# Patient Record
Sex: FEMALE
[De-identification: names, ages, dates, MRNs, and addresses within clinical notes are randomized per-mention and may not be internally consistent; named-entity substitution may affect disease eponyms.]

---

## 2019-12-23 PROBLEM — Z00.00 ENCOUNTER FOR PREVENTIVE HEALTH EXAMINATION: Status: ACTIVE | Noted: 2019-12-23

## 2020-01-02 ENCOUNTER — MESSAGE (OUTPATIENT)
Age: 45
End: 2020-01-02

## 2020-01-03 ENCOUNTER — APPOINTMENT (OUTPATIENT)
Dept: UROLOGY | Facility: CLINIC | Age: 45
End: 2020-01-03
Payer: MEDICAID

## 2020-01-03 VITALS
SYSTOLIC BLOOD PRESSURE: 139 MMHG | BODY MASS INDEX: 29.23 KG/M2 | HEART RATE: 77 BPM | OXYGEN SATURATION: 97 % | HEIGHT: 63 IN | WEIGHT: 165 LBS | DIASTOLIC BLOOD PRESSURE: 83 MMHG

## 2020-01-03 DIAGNOSIS — Z87.2 PERSONAL HISTORY OF DISEASES OF THE SKIN AND SUBCUTANEOUS TISSUE: ICD-10-CM

## 2020-01-03 DIAGNOSIS — R39.15 URGENCY OF URINATION: ICD-10-CM

## 2020-01-03 DIAGNOSIS — N39.0 URINARY TRACT INFECTION, SITE NOT SPECIFIED: ICD-10-CM

## 2020-01-03 LAB
BILIRUB UR QL STRIP: NORMAL
CLARITY UR: CLEAR
COLLECTION METHOD: NORMAL
GLUCOSE UR-MCNC: NORMAL
HCG UR QL: 0.2 EU/DL
HGB UR QL STRIP.AUTO: NORMAL
KETONES UR-MCNC: NORMAL
LEUKOCYTE ESTERASE UR QL STRIP: NORMAL
NITRITE UR QL STRIP: NORMAL
PH UR STRIP: 6
PROT UR STRIP-MCNC: NORMAL
SP GR UR STRIP: 1.01

## 2020-01-03 PROCEDURE — 81003 URINALYSIS AUTO W/O SCOPE: CPT | Mod: QW

## 2020-01-03 PROCEDURE — 99204 OFFICE O/P NEW MOD 45 MIN: CPT | Mod: 25

## 2020-01-03 NOTE — REVIEW OF SYSTEMS
[Negative] : Heme/Lymph [Genital yeast infection] : genital yeast infection [Wake up at night to urinate  How many times?  ___] : wakes up to urinate [unfilled] times during the night [Genital bacterial infection] : genital bacterial infection

## 2020-01-06 LAB
APPEARANCE: CLEAR
BACTERIA UR CULT: NORMAL
BACTERIA: NEGATIVE
BILIRUBIN URINE: NEGATIVE
BLOOD URINE: NEGATIVE
COLOR: COLORLESS
GLUCOSE QUALITATIVE U: NEGATIVE
HYALINE CASTS: 0 /LPF
KETONES URINE: NEGATIVE
LEUKOCYTE ESTERASE URINE: NEGATIVE
MICROSCOPIC-UA: NORMAL
NITRITE URINE: NEGATIVE
PH URINE: 5.5
PROTEIN URINE: NEGATIVE
RED BLOOD CELLS URINE: 0 /HPF
SPECIFIC GRAVITY URINE: 1.01
SQUAMOUS EPITHELIAL CELLS: 0 /HPF
UROBILINOGEN URINE: NORMAL
WHITE BLOOD CELLS URINE: 0 /HPF

## 2020-01-07 ENCOUNTER — APPOINTMENT (OUTPATIENT)
Dept: UROLOGY | Facility: CLINIC | Age: 45
End: 2020-01-07

## 2020-01-17 ENCOUNTER — TRANSCRIPTION ENCOUNTER (OUTPATIENT)
Age: 45
End: 2020-01-17

## 2020-01-20 PROBLEM — Z87.2 HISTORY OF PSORIASIS: Status: RESOLVED | Noted: 2020-01-20 | Resolved: 2020-01-20

## 2020-01-20 RX ORDER — GUSELKUMAB 100 MG/ML
INJECTION SUBCUTANEOUS
Refills: 0 | Status: ACTIVE | COMMUNITY

## 2020-01-24 NOTE — ASSESSMENT
[FreeTextEntry1] : 45 yo M with urinary urgency and frequent UTIs in setting of treatment of monoclonal antibody against interleukin. This therapy increases her risk of all infections including UTIs. Patient was counselled on behavioral modifications to reduce her risk of urinary tract infections including wiping front to back, good general hygene, voiding following sex, avoiding douching and spermacides. We discussed cranberry supplements which may reduce UTI, though the data is equivocal. We also discussed different antibiotic therapy including low dose antibiotic suppression, post coital prophylaxis, and self start therapy. Recommended Renal US to evaluate upper tracts. Patient is about to leave country, but states she will pursue RBUS in [place]. Recommend mirabegron for urgency, which is likely due to frequent UTIs. Will Rx for macrobid 100 mg daily for suppressive ppx. She will RTO when she returns from international travel.

## 2020-01-24 NOTE — PHYSICAL EXAM
[General Appearance - Well Developed] : well developed [General Appearance - Well Nourished] : well nourished [Normal Appearance] : normal appearance [Well Groomed] : well groomed [General Appearance - In No Acute Distress] : no acute distress [Edema] : no peripheral edema [] : no respiratory distress [Respiration, Rhythm And Depth] : normal respiratory rhythm and effort [Exaggerated Use Of Accessory Muscles For Inspiration] : no accessory muscle use [Abdomen Soft] : soft [Abdomen Tenderness] : non-tender [Costovertebral Angle Tenderness] : no ~M costovertebral angle tenderness [Urinary Bladder Findings] : the bladder was normal on palpation [Normal Station and Gait] : the gait and station were normal for the patient's age [FreeTextEntry1] : psoriatic plaques on lower extremities [No Focal Deficits] : no focal deficits [Oriented To Time, Place, And Person] : oriented to person, place, and time [Affect] : the affect was normal [Mood] : the mood was normal [Not Anxious] : not anxious [No Palpable Adenopathy] : no palpable adenopathy

## 2020-01-24 NOTE — HISTORY OF PRESENT ILLNESS
[FreeTextEntry1] : 44 year old woman  seen 01/03/2020 with complaint of frequent UTI. This began months ago after pt was started on biologic for her psoriasis. She gets UTIs about every few weeks. Her usual UTI symptoms are dysuria, urgency, frequency, and bladder pain. She also feels incomplete bladder emptying. Nocturia 6-7x/night. She had cystoscopy by urologist in [facility] [place] which was unremarkable. \par It is severe in severity. Nothing makes the symptoms better, nothing makes sx worse. \par UTIs are not associated with sexual activity. \par No hematuria. No incontinence. No fevers, no chills, no nausea, no vomiting, no flank pain. No family history contributory to Frequent UTIs.

## 2020-06-15 ENCOUNTER — APPOINTMENT (OUTPATIENT)
Dept: RHEUMATOLOGY | Facility: CLINIC | Age: 45
End: 2020-06-15

## 2020-06-22 ENCOUNTER — APPOINTMENT (OUTPATIENT)
Dept: UROLOGY | Facility: CLINIC | Age: 45
End: 2020-06-22

## 2021-06-18 ENCOUNTER — TRANSCRIPTION ENCOUNTER (OUTPATIENT)
Age: 46
End: 2021-06-18

## 2021-07-14 RX ORDER — NITROFURANTOIN (MONOHYDRATE/MACROCRYSTALS) 25; 75 MG/1; MG/1
100 CAPSULE ORAL
Qty: 30 | Refills: 5 | Status: COMPLETED | COMMUNITY
Start: 2020-01-03 | End: 2021-07-14

## 2021-07-15 ENCOUNTER — NON-APPOINTMENT (OUTPATIENT)
Age: 46
End: 2021-07-15

## 2021-07-15 ENCOUNTER — APPOINTMENT (OUTPATIENT)
Dept: ENDOCRINOLOGY | Facility: CLINIC | Age: 46
End: 2021-07-15
Payer: MEDICAID

## 2021-07-15 DIAGNOSIS — Z78.9 OTHER SPECIFIED HEALTH STATUS: ICD-10-CM

## 2021-07-15 DIAGNOSIS — L40.9 PSORIASIS, UNSPECIFIED: ICD-10-CM

## 2021-07-15 DIAGNOSIS — I67.83 POSTERIOR REVERSIBLE ENCEPHALOPATHY SYNDROME: ICD-10-CM

## 2021-07-15 PROCEDURE — 99204 OFFICE O/P NEW MOD 45 MIN: CPT

## 2021-07-15 PROCEDURE — 99072 ADDL SUPL MATRL&STAF TM PHE: CPT

## 2021-07-15 RX ORDER — OXYBUTYNIN CHLORIDE 5 MG/1
5 TABLET, EXTENDED RELEASE ORAL
Qty: 90 | Refills: 2 | Status: COMPLETED | COMMUNITY
Start: 2020-01-03 | End: 2021-07-15

## 2021-07-15 RX ORDER — MIRABEGRON 50 MG/1
50 TABLET, FILM COATED, EXTENDED RELEASE ORAL
Refills: 0 | Status: COMPLETED | COMMUNITY
End: 2021-07-15

## 2021-07-15 RX ORDER — FLUCONAZOLE 100 MG/1
100 TABLET ORAL DAILY
Qty: 4 | Refills: 0 | Status: COMPLETED | COMMUNITY
Start: 2020-01-03 | End: 2021-07-15

## 2021-12-20 ENCOUNTER — APPOINTMENT (OUTPATIENT)
Dept: ENDOCRINOLOGY | Facility: CLINIC | Age: 46
End: 2021-12-20

## 2022-06-30 ENCOUNTER — APPOINTMENT (OUTPATIENT)
Dept: ENDOCRINOLOGY | Facility: CLINIC | Age: 47
End: 2022-06-30

## 2022-07-25 LAB — TSH SERPL-ACNC: 1.17

## 2022-07-26 ENCOUNTER — APPOINTMENT (OUTPATIENT)
Dept: ENDOCRINOLOGY | Facility: CLINIC | Age: 47
End: 2022-07-26

## 2022-07-26 VITALS
HEIGHT: 63 IN | DIASTOLIC BLOOD PRESSURE: 84 MMHG | OXYGEN SATURATION: 99 % | SYSTOLIC BLOOD PRESSURE: 122 MMHG | BODY MASS INDEX: 26.58 KG/M2 | HEART RATE: 71 BPM | WEIGHT: 150 LBS

## 2022-07-26 PROCEDURE — 99213 OFFICE O/P EST LOW 20 MIN: CPT

## 2022-07-26 NOTE — ASSESSMENT
[FreeTextEntry1] : Dinora is a 46 yr old female, who was seen for follow up of thyroid nodules.\par Previous sonogram revealed 3 nodules  in left lobe 0.34 x 0.157 x 0.239 cm, second one 0.531 x 0.317 x 0.454 and 3rd one 0.232 x 0.172 x 0.27 cm\par Sonogram from 7/19/22 revealed one left lobe cyst 0.7x0.7x0.2cm. \par \par Patient is worried that the thyroid cyst could develop into cancer as her mother was diagnosis with liver cancer and passed away recently. \par \par I have discussed with the patient the incidence of thyroid nodules (fairly high) and the incidence of thyroid cancer (fairly low). We also discussed what can be the worrisome features of malignant nodule and increased incidence of thyroid cancer in patients >60 years old.\par \par We discussed with patient that per EMIGDIO  guidelines  FNA is not recommended in sub centimeter nodules in low risk patient like herself risk of malignancy is very low.\par \par Vitamin D Deficiency - PMD treating with vitamin D 50,000 units weekly\par \par RTO in 6 months for follow up US and labs with Dr. Rivera \par \par \par

## 2022-07-26 NOTE — PHYSICAL EXAM
[Alert] : alert [Well Nourished] : well nourished [No Acute Distress] : no acute distress [Well Developed] : well developed [Normal Sclera/Conjunctiva] : normal sclera/conjunctiva [No Proptosis] : no proptosis [No LAD] : no lymphadenopathy [Thyroid Not Enlarged] : the thyroid was not enlarged [No Thyroid Nodules] : no palpable thyroid nodules [No Respiratory Distress] : no respiratory distress [No Accessory Muscle Use] : no accessory muscle use [Normal Rate and Effort] : normal respiratory rate and effort [Clear to Auscultation] : lungs were clear to auscultation bilaterally [Normal S1, S2] : normal S1 and S2 [Normal Rate] : heart rate was normal [Regular Rhythm] : with a regular rhythm [No Edema] : no peripheral edema [No Stigmata of Cushings Syndrome] : no stigmata of Cushings Syndrome [Normal Gait] : normal gait [No Motor Deficits] : the motor exam was normal [No Tremors] : no tremors [Oriented x3] : oriented to person, place, and time [Normal Affect] : the affect was normal [Normal Insight/Judgement] : insight and judgment were intact [Normal Mood] : the mood was normal [Acanthosis Nigricans] : no acanthosis nigricans

## 2022-07-26 NOTE — REVIEW OF SYSTEMS
[Recent Weight Loss (___ Lbs)] : recent weight loss: [unfilled] lbs [Depression] : depression [Fatigue] : no fatigue [Decreased Appetite] : appetite not decreased [Visual Field Defect] : no visual field defect [Blurred Vision] : no blurred vision [Dysphagia] : no dysphagia [Neck Pain] : no neck pain [Dysphonia] : no dysphonia [Chest Pain] : no chest pain [Palpitations] : no palpitations [Dry Skin] : no dry skin [Hair Loss] : no hair loss [Headaches] : no headaches [Tremors] : no tremors [Anxiety] : no anxiety [Cold Intolerance] : no cold intolerance [Heat Intolerance] : no heat intolerance [de-identified] : mother recently passed

## 2022-07-26 NOTE — HISTORY OF PRESENT ILLNESS
[FreeTextEntry1] : Dinora is a 46 yr old female, here for follow up of thyroid nodule.\par She has Psoriasis, was on steroids, gained 30 pounds in 3 months in 2019.Since then gaining and loosing some.\par During a routine visit, she had thyroid ultrasound  in 7/21 showing some nodules.\par \par No family h/o thyroid disorder or cancer.no h/o neck radiation. No dysphagia, no SOB.Denies heat or cold intolerance, no constipation or diarrhea, no palpitations, energy level fair, no skin or hair changes.\par 2 kids, still has her menstrual cycles, regular.\par Has overactive bladder.Seeing urologist.\par \par Patient reports her mother recently passed due to stage 4 liver cancer \par \par Labs: \par 7/19/22\par TSH 1.1\par Free T4 1.0\par Total T3 89\par \par Patient follow up with gastroenterology due to reactive Hep A on labs

## 2023-07-20 ENCOUNTER — APPOINTMENT (OUTPATIENT)
Dept: ENDOCRINOLOGY | Facility: CLINIC | Age: 48
End: 2023-07-20
Payer: MEDICAID

## 2023-07-20 VITALS
DIASTOLIC BLOOD PRESSURE: 60 MMHG | BODY MASS INDEX: 25.69 KG/M2 | WEIGHT: 145 LBS | HEART RATE: 98 BPM | OXYGEN SATURATION: 99 % | HEIGHT: 63 IN | SYSTOLIC BLOOD PRESSURE: 120 MMHG

## 2023-07-20 DIAGNOSIS — E55.9 VITAMIN D DEFICIENCY, UNSPECIFIED: ICD-10-CM

## 2023-07-20 DIAGNOSIS — E04.2 NONTOXIC MULTINODULAR GOITER: ICD-10-CM

## 2023-07-20 PROCEDURE — 99214 OFFICE O/P EST MOD 30 MIN: CPT

## 2023-12-20 ENCOUNTER — OFFICE (OUTPATIENT)
Dept: URBAN - METROPOLITAN AREA CLINIC 12 | Facility: CLINIC | Age: 48
Setting detail: OPHTHALMOLOGY
End: 2023-12-20
Payer: COMMERCIAL

## 2023-12-20 DIAGNOSIS — H16.103: ICD-10-CM

## 2023-12-20 DIAGNOSIS — H16.223: ICD-10-CM

## 2023-12-20 PROCEDURE — 92012 INTRM OPH EXAM EST PATIENT: CPT | Performed by: OPTOMETRIST

## 2023-12-20 ASSESSMENT — SPHEQUIV_DERIVED
OS_SPHEQUIV: -1
OD_SPHEQUIV: -1.125
OS_SPHEQUIV: -1.375
OD_SPHEQUIV: -0.625
OS_SPHEQUIV: -1.125
OD_SPHEQUIV: -0.875

## 2023-12-20 ASSESSMENT — REFRACTION_MANIFEST
OD_AXIS: 180
OS_VA1: 20/25-2
OS_SPHERE: -1.00
OD_CYLINDER: -0.25
OD_VA1: 20/20
OD_CYLINDER: -0.75
OS_VA1: 20/30+
OD_SPHERE: -0.50
OD_VA1: 20/20-2
OS_AXIS: 180
OS_SPHERE: -0.75
OS_ADD: +1.50
OS_CYLINDER: -0.75
OD_AXIS: 180
OD_SPHERE: -0.50
OS_ADD: +1.50
OD_ADD: +1.50
OS_AXIS: 180
OD_ADD: +1.50
OS_CYLINDER: -0.50

## 2023-12-20 ASSESSMENT — REFRACTION_CURRENTRX
OD_VPRISM_DIRECTION: SV
OD_OVR_VA: 20/
OD_VPRISM_DIRECTION: SV
OD_AXIS: 180
OD_AXIS: 014
OS_SPHERE: +0.50
OS_VPRISM_DIRECTION: SV
OS_CYLINDER: -0.75
OD_SPHERE: +1.00
OS_SPHERE: -1.00
OS_OVR_VA: 20/
OS_AXIS: 175
OD_OVR_VA: 20/
OD_CYLINDER: -0.25
OS_AXIS: 179
OS_OVR_VA: 20/
OS_VPRISM_DIRECTION: SV
OD_SPHERE: -0.50
OS_CYLINDER: -0.75
OD_CYLINDER: -0.25

## 2023-12-20 ASSESSMENT — SUPERFICIAL PUNCTATE KERATITIS (SPK)
OD_SPK: 2+
OS_SPK: 2+

## 2023-12-20 ASSESSMENT — REFRACTION_AUTOREFRACTION
OS_SPHERE: -1.00
OS_CYLINDER: -0.25
OD_AXIS: 016
OD_SPHERE: -0.75
OD_CYLINDER: -0.75
OS_AXIS: 024

## 2023-12-20 ASSESSMENT — CONFRONTATIONAL VISUAL FIELD TEST (CVF)
OD_FINDINGS: FULL
OS_FINDINGS: FULL

## 2024-07-22 ENCOUNTER — APPOINTMENT (OUTPATIENT)
Dept: ENDOCRINOLOGY | Facility: CLINIC | Age: 49
End: 2024-07-22
Payer: MEDICAID

## 2024-07-22 VITALS
DIASTOLIC BLOOD PRESSURE: 78 MMHG | WEIGHT: 135 LBS | SYSTOLIC BLOOD PRESSURE: 124 MMHG | HEIGHT: 63 IN | HEART RATE: 72 BPM | BODY MASS INDEX: 23.92 KG/M2 | OXYGEN SATURATION: 99 %

## 2024-07-22 DIAGNOSIS — E04.2 NONTOXIC MULTINODULAR GOITER: ICD-10-CM

## 2024-07-22 PROCEDURE — 99214 OFFICE O/P EST MOD 30 MIN: CPT

## 2024-07-22 NOTE — ASSESSMENT
[FreeTextEntry1] : Dinora is a 48 yr old female, who is here for follow up of  thyroid nodule. She has Psoriasis, was on steroids, gained 30 pounds in 3 months in 2019.Since then gaining and loosing some. During a routine visit, she had thyroid ultrasound  in 7/21 showing some nodules here for the same. Reviewed report with patient and her . Has 3 nodules  in left lobe 0.34 x 0.157 x 0.239 cm, second one 0.531 x 0.317 x 0.454 and 3rd one 0.232 x 0.172 x 0.27 cm She then had follow up US in 7/22  and  in 7/23 stable sub centimeter left sided cyst. Now had recent repeat US in 7/24 shows stable small sub centimeter cyst.  I have again  discussed with the patient the incidence of thyroid nodules (fairly high) and the incidence of thyroid cancer (fairly low). We also discussed what can be the worrisome features of malignant nodule and increased incidence of thyroid cancer in patients >60 years old.  We discussed with patient that per EMIGDIO  guidelines  FNA is not recommended in sub centimeter nodules in low risk patient like herself risk of malignancy is very low.   will recommend 1 yr follow up US.   RTC in 1 yr

## 2024-07-22 NOTE — REVIEW OF SYSTEMS
[Fatigue] : no fatigue [Decreased Appetite] : appetite not decreased [Recent Weight Gain (___ Lbs)] : no recent weight gain [Recent Weight Loss (___ Lbs)] : no recent weight loss [Visual Field Defect] : no visual field defect [Dry Eyes] : no dryness [Eye Pain] : no pain [Dysphagia] : no dysphagia [Neck Pain] : no neck pain [Dysphonia] : no dysphonia [Chest Pain] : no chest pain [Palpitations] : no palpitations [Lower Ext Edema] : no lower extremity edema [Shortness Of Breath] : no shortness of breath [Cough] : no cough [Orthopnea] : no orthopnea [Nausea] : no nausea [Constipation] : no constipation [Abdominal Pain] : no abdominal pain [Vomiting] : no vomiting [Diarrhea] : no diarrhea [Polyuria] : no polyuria [Dysuria] : no dysuria [Joint Pain] : no joint pain [Muscle Weakness] : no muscle weakness [Myalgia] : no myalgia  [Acanthosis] : no acanthosis  [Acne] : no acne [Dry Skin] : no dry skin [Headaches] : no headaches [Dizziness] : no dizziness [Tremors] : no tremors [Pain/Numbness of Digits] : no pain/numbness of digits [Depression] : no depression [Insomnia] : no insomnia [Anxiety] : no anxiety [Polydipsia] : no polydipsia [Cold Intolerance] : no cold intolerance [Heat Intolerance] : no heat intolerance [Easy Bleeding] : no ~M tendency for easy bleeding [Easy Bruising] : no tendency for easy bruising

## 2024-07-22 NOTE — HISTORY OF PRESENT ILLNESS
[FreeTextEntry1] : Dinora is a 48 yr old female, who is here for follow up  of thyroid nodule. She has Psoriasis, was on steroids, gained 30 pounds in 3 months in 2019.Since then gaining and loosing some. During a routine visit, she had thyroid ultrasound  in 7/21 showing some nodules .  She then had follow up US in 7/22 showed 0.7 cm left sided cyst. Then had yearly follow up US in 7/23 stable sub centimeter left sided cyst. Had recent repeat US in 7/24.  Here for follow up. No family h/o thyroid disorder or cancer.no h/o neck radiation. No dysphagia, no SOB. Denies heat or cold intolerance, no constipation or diarrhea, no palpitations, energy level fair, no skin or hair changes. 2 kids, still has her menstrual cycles, regular. Has overactive bladder.Seeing urologist.

## 2024-07-22 NOTE — PHYSICAL EXAM
[Alert] : alert [Well Nourished] : well nourished [No Acute Distress] : no acute distress [Normal Sclera/Conjunctiva] : normal sclera/conjunctiva [No Proptosis] : no proptosis [No Lid Lag] : no lid lag [No Neck Mass] : no neck mass was observed [Thyroid Not Enlarged] : the thyroid was not enlarged [No Thyroid Nodules] : no palpable thyroid nodules [No Respiratory Distress] : no respiratory distress [No Accessory Muscle Use] : no accessory muscle use [Clear to Auscultation] : lungs were clear to auscultation bilaterally [Normal S1, S2] : normal S1 and S2 [Normal Rate] : heart rate was normal [Regular Rhythm] : with a regular rhythm [Oriented x3] : oriented to person, place, and time [Normal Affect] : the affect was normal [Normal Insight/Judgement] : insight and judgment were intact [Normal Mood] : the mood was normal

## 2025-07-28 ENCOUNTER — RX ONLY (RX ONLY)
Age: 50
End: 2025-07-28

## 2025-07-28 ENCOUNTER — OFFICE (OUTPATIENT)
Dept: URBAN - METROPOLITAN AREA CLINIC 12 | Facility: CLINIC | Age: 50
Setting detail: OPHTHALMOLOGY
End: 2025-07-28
Payer: COMMERCIAL

## 2025-07-28 DIAGNOSIS — H16.223: ICD-10-CM

## 2025-07-28 DIAGNOSIS — H43.393: ICD-10-CM

## 2025-07-28 DIAGNOSIS — H52.13: ICD-10-CM

## 2025-07-28 LAB
HBA1C MFR BLD HPLC: 5
LDLC SERPL DIRECT ASSAY-MCNC: 107
TSH SERPL-ACNC: 1.42

## 2025-07-28 PROCEDURE — 92015 DETERMINE REFRACTIVE STATE: CPT | Performed by: OPTOMETRIST

## 2025-07-28 PROCEDURE — 92014 COMPRE OPH EXAM EST PT 1/>: CPT | Performed by: OPTOMETRIST

## 2025-07-28 ASSESSMENT — KERATOMETRY
OS_AXISANGLE_DEGREES: 115
OD_K1POWER_DIOPTERS: 41.50
OD_AXISANGLE_DEGREES: 069
OD_K2POWER_DIOPTERS: 42.75
OS_K1POWER_DIOPTERS: 42.00
OS_K2POWER_DIOPTERS: 42.25

## 2025-07-28 ASSESSMENT — REFRACTION_MANIFEST
OD_ADD: +1.50
OS_AXIS: 180
OD_AXIS: 180
OS_ADD: +1.50
OS_CYLINDER: -0.50
OS_SPHERE: -1.00
OS_VA1: 20/25-2
OD_SPHERE: -0.50
OD_VA1: 20/20
OS_CYLINDER: -0.75
OD_AXIS: 180
OS_CYLINDER: -0.25
OD_CYLINDER: -0.75
OS_VA1: 20/30+
OD_AXIS: 180
OS_SPHERE: -0.75
OD_ADD: +1.50
OD_CYLINDER: -0.25
OS_AXIS: 180
OD_SPHERE: -0.50
OD_SPHERE: -0.50
OD_CYLINDER: -0.25
OS_AXIS: 180
OS_SPHERE: -0.75
OS_ADD: +1.50
OD_VA1: 20/20-2

## 2025-07-28 ASSESSMENT — CONFRONTATIONAL VISUAL FIELD TEST (CVF)
OD_FINDINGS: FULL
OS_FINDINGS: FULL

## 2025-07-28 ASSESSMENT — VISUAL ACUITY
OS_BCVA: 20/20
OD_BCVA: 20/50-1

## 2025-07-28 ASSESSMENT — TONOMETRY
OD_IOP_MMHG: 13
OS_IOP_MMHG: 13

## 2025-07-28 ASSESSMENT — REFRACTION_CURRENTRX
OS_VPRISM_DIRECTION: SV
OS_OVR_VA: 20/
OD_SPHERE: +1.50
OS_VPRISM_DIRECTION: SV
OS_SPHERE: +1.50
OS_OVR_VA: 20/
OS_CYLINDER: -0.75
OD_VPRISM_DIRECTION: SV
OD_OVR_VA: 20/
OS_AXIS: 179
OD_CYLINDER: -0.25
OD_OVR_VA: 20/
OD_SPHERE: -0.50
OS_SPHERE: -1.00
OD_AXIS: 180
OD_VPRISM_DIRECTION: SV

## 2025-07-28 ASSESSMENT — REFRACTION_AUTOREFRACTION
OS_AXIS: 000
OS_SPHERE: -0.75
OD_CYLINDER: -0.25
OD_AXIS: 157
OS_CYLINDER: SPHERE
OD_SPHERE: -0.50

## 2025-07-28 ASSESSMENT — SUPERFICIAL PUNCTATE KERATITIS (SPK)
OS_SPK: 2+
OD_SPK: 2+

## 2025-07-29 ENCOUNTER — APPOINTMENT (OUTPATIENT)
Dept: ENDOCRINOLOGY | Facility: CLINIC | Age: 50
End: 2025-07-29
Payer: MEDICAID

## 2025-07-29 VITALS
SYSTOLIC BLOOD PRESSURE: 130 MMHG | HEIGHT: 63 IN | DIASTOLIC BLOOD PRESSURE: 80 MMHG | HEART RATE: 90 BPM | BODY MASS INDEX: 27.11 KG/M2 | OXYGEN SATURATION: 98 % | WEIGHT: 153 LBS

## 2025-07-29 DIAGNOSIS — E04.2 NONTOXIC MULTINODULAR GOITER: ICD-10-CM

## 2025-07-29 PROCEDURE — 99213 OFFICE O/P EST LOW 20 MIN: CPT
